# Patient Record
Sex: MALE | Race: WHITE | NOT HISPANIC OR LATINO | Employment: OTHER | URBAN - METROPOLITAN AREA
[De-identification: names, ages, dates, MRNs, and addresses within clinical notes are randomized per-mention and may not be internally consistent; named-entity substitution may affect disease eponyms.]

---

## 2017-11-08 ENCOUNTER — ESTABLISHED COMPREHENSIVE EXAM (OUTPATIENT)
Dept: URBAN - METROPOLITAN AREA CLINIC 39 | Facility: CLINIC | Age: 82
End: 2017-11-08

## 2017-11-08 DIAGNOSIS — E11.9: ICD-10-CM

## 2017-11-08 DIAGNOSIS — H35.373: ICD-10-CM

## 2017-11-08 DIAGNOSIS — Z96.1: ICD-10-CM

## 2017-11-08 DIAGNOSIS — D31.32: ICD-10-CM

## 2017-11-08 DIAGNOSIS — H04.123: ICD-10-CM

## 2017-11-08 DIAGNOSIS — H43.813: ICD-10-CM

## 2017-11-08 PROCEDURE — 92015 DETERMINE REFRACTIVE STATE: CPT

## 2017-11-08 PROCEDURE — 92014 COMPRE OPH EXAM EST PT 1/>: CPT

## 2017-11-08 ASSESSMENT — VISUAL ACUITY
OS_SC: 20/25-1
OD_SC: 20/25-1
OS_SC: J1
OD_SC: J2

## 2017-11-08 ASSESSMENT — TONOMETRY
OD_IOP_MMHG: 13
OS_IOP_MMHG: 13

## 2018-11-14 ENCOUNTER — ESTABLISHED COMPREHENSIVE EXAM (OUTPATIENT)
Dept: URBAN - METROPOLITAN AREA CLINIC 39 | Facility: CLINIC | Age: 83
End: 2018-11-14

## 2018-11-14 DIAGNOSIS — H35.373: ICD-10-CM

## 2018-11-14 DIAGNOSIS — H04.123: ICD-10-CM

## 2018-11-14 DIAGNOSIS — H43.813: ICD-10-CM

## 2018-11-14 DIAGNOSIS — E11.9: ICD-10-CM

## 2018-11-14 DIAGNOSIS — Z96.1: ICD-10-CM

## 2018-11-14 DIAGNOSIS — D31.32: ICD-10-CM

## 2018-11-14 PROCEDURE — 92014 COMPRE OPH EXAM EST PT 1/>: CPT

## 2018-11-14 PROCEDURE — 92134 CPTRZ OPH DX IMG PST SGM RTA: CPT

## 2018-11-14 PROCEDURE — 92015 DETERMINE REFRACTIVE STATE: CPT

## 2018-11-14 ASSESSMENT — TONOMETRY
OS_IOP_MMHG: 10
OD_IOP_MMHG: 12

## 2018-11-14 ASSESSMENT — VISUAL ACUITY
OS_SC: J1
OS_SC: 20/40
OU_SC: J1
OD_SC: J3
OD_SC: 20/50
OU_SC: 20/30-1

## 2018-12-05 ENCOUNTER — FOLLOW UP (OUTPATIENT)
Dept: URBAN - METROPOLITAN AREA CLINIC 39 | Facility: CLINIC | Age: 83
End: 2018-12-05

## 2018-12-05 DIAGNOSIS — H52.01: ICD-10-CM

## 2018-12-05 DIAGNOSIS — H52.203: ICD-10-CM

## 2018-12-05 PROCEDURE — 92015GRNC REFRACTION GLASSES RECHECK - NO CHARGE

## 2018-12-05 ASSESSMENT — VISUAL ACUITY
OS_SC: 20/40
OU_SC: 20/25-1
OS_SC: J2
OD_SC: J2

## 2018-12-05 ASSESSMENT — TONOMETRY
OS_IOP_MMHG: 15
OD_IOP_MMHG: 15

## 2019-02-06 ENCOUNTER — REFRACTION ONLY (OUTPATIENT)
Dept: URBAN - METROPOLITAN AREA CLINIC 39 | Facility: CLINIC | Age: 84
End: 2019-02-06

## 2019-02-06 DIAGNOSIS — H52.01: ICD-10-CM

## 2019-02-06 DIAGNOSIS — H52.203: ICD-10-CM

## 2019-02-06 PROCEDURE — 92015GRNC REFRACTION GLASSES RECHECK - NO CHARGE

## 2019-02-06 ASSESSMENT — VISUAL ACUITY
OS_SC: 20/40
OU_SC: 20/30-1
OU_SC: J2
OD_SC: J3
OD_SC: 20/40
OS_SC: J3

## 2019-09-13 NOTE — PROCEDURE NOTE: SURGICAL
<p>Prior to commencing surgery patient identification, surgical procedure, site, and side were confirmed by Dr. Araseli Huffman. Following topical proparacaine anesthesia, the patient was positioned at the YAG laser, a contact lens coupled to the cornea with methylcellulose and an axial posterior capsulotomy performed without complication using 3.1 Mj x 20. Excess methylcellulose was washed from the eye, one drop of Alphagan was instilled and the patient returned to the holding area having tolerated the procedure well and without complication. </p><p>MRN:[id number]</p>

## 2019-12-09 ENCOUNTER — ESTABLISHED COMPREHENSIVE EXAM (OUTPATIENT)
Dept: URBAN - METROPOLITAN AREA CLINIC 40 | Facility: CLINIC | Age: 84
End: 2019-12-09

## 2019-12-09 DIAGNOSIS — H43.813: ICD-10-CM

## 2019-12-09 DIAGNOSIS — H52.01: ICD-10-CM

## 2019-12-09 DIAGNOSIS — E11.9: ICD-10-CM

## 2019-12-09 DIAGNOSIS — H35.373: ICD-10-CM

## 2019-12-09 DIAGNOSIS — D31.32: ICD-10-CM

## 2019-12-09 DIAGNOSIS — H04.123: ICD-10-CM

## 2019-12-09 DIAGNOSIS — H52.203: ICD-10-CM

## 2019-12-09 PROCEDURE — 92015 DETERMINE REFRACTIVE STATE: CPT

## 2019-12-09 PROCEDURE — 92250 FUNDUS PHOTOGRAPHY W/I&R: CPT

## 2019-12-09 PROCEDURE — 92014 COMPRE OPH EXAM EST PT 1/>: CPT

## 2019-12-09 ASSESSMENT — VISUAL ACUITY
OU_SC: J1
OD_SC: 20/40
OU_SC: 20/20-2
OD_SC: J2
OS_SC: 20/30
OS_SC: J1

## 2019-12-09 ASSESSMENT — TONOMETRY
OD_IOP_MMHG: 12
OS_IOP_MMHG: 12

## 2020-12-14 ENCOUNTER — ESTABLISHED COMPREHENSIVE EXAM (OUTPATIENT)
Dept: URBAN - METROPOLITAN AREA CLINIC 40 | Facility: CLINIC | Age: 85
End: 2020-12-14

## 2020-12-14 DIAGNOSIS — E11.9: ICD-10-CM

## 2020-12-14 DIAGNOSIS — D31.32: ICD-10-CM

## 2020-12-14 DIAGNOSIS — H04.123: ICD-10-CM

## 2020-12-14 DIAGNOSIS — H52.203: ICD-10-CM

## 2020-12-14 DIAGNOSIS — H35.373: ICD-10-CM

## 2020-12-14 DIAGNOSIS — H52.01: ICD-10-CM

## 2020-12-14 DIAGNOSIS — H43.813: ICD-10-CM

## 2020-12-14 PROCEDURE — 92014 COMPRE OPH EXAM EST PT 1/>: CPT

## 2020-12-14 PROCEDURE — 92015 DETERMINE REFRACTIVE STATE: CPT

## 2020-12-14 ASSESSMENT — VISUAL ACUITY
OD_SC: J3
OS_SC: J2
OD_SC: 20/50-2
OS_SC: 20/30
OU_SC: 20/25

## 2020-12-14 ASSESSMENT — TONOMETRY
OD_IOP_MMHG: 12
OS_IOP_MMHG: 12

## 2020-12-29 ENCOUNTER — RETINA CONSULT (OUTPATIENT)
Dept: URBAN - METROPOLITAN AREA CLINIC 39 | Facility: CLINIC | Age: 85
End: 2020-12-29

## 2020-12-29 VITALS
SYSTOLIC BLOOD PRESSURE: 146 MMHG | HEART RATE: 62 BPM | RESPIRATION RATE: 18 BRPM | HEIGHT: 60 IN | DIASTOLIC BLOOD PRESSURE: 82 MMHG

## 2020-12-29 DIAGNOSIS — H35.373: ICD-10-CM

## 2020-12-29 DIAGNOSIS — H43.813: ICD-10-CM

## 2020-12-29 DIAGNOSIS — E11.9: ICD-10-CM

## 2020-12-29 DIAGNOSIS — D31.32: ICD-10-CM

## 2020-12-29 PROCEDURE — 92134 CPTRZ OPH DX IMG PST SGM RTA: CPT

## 2020-12-29 PROCEDURE — 92250 FUNDUS PHOTOGRAPHY W/I&R: CPT

## 2020-12-29 PROCEDURE — 92202 OPSCPY EXTND ON/MAC DRAW: CPT

## 2020-12-29 PROCEDURE — 92014 COMPRE OPH EXAM EST PT 1/>: CPT

## 2020-12-29 ASSESSMENT — TONOMETRY
OD_IOP_MMHG: 13
OS_IOP_MMHG: 14

## 2020-12-29 ASSESSMENT — VISUAL ACUITY
OS_SC: 20/30
OD_SC: 20/40
OS_SC: J2
OD_SC: J3

## 2021-12-20 ENCOUNTER — COMPREHENSIVE EXAM (OUTPATIENT)
Dept: URBAN - METROPOLITAN AREA CLINIC 40 | Facility: CLINIC | Age: 86
End: 2021-12-20

## 2021-12-20 DIAGNOSIS — E11.9: ICD-10-CM

## 2021-12-20 DIAGNOSIS — H52.01: ICD-10-CM

## 2021-12-20 DIAGNOSIS — D31.32: ICD-10-CM

## 2021-12-20 DIAGNOSIS — H43.813: ICD-10-CM

## 2021-12-20 DIAGNOSIS — H52.223: ICD-10-CM

## 2021-12-20 DIAGNOSIS — H35.373: ICD-10-CM

## 2021-12-20 DIAGNOSIS — H04.123: ICD-10-CM

## 2021-12-20 PROCEDURE — 92014 COMPRE OPH EXAM EST PT 1/>: CPT

## 2021-12-20 PROCEDURE — 92015 DETERMINE REFRACTIVE STATE: CPT

## 2021-12-20 ASSESSMENT — VISUAL ACUITY
OS_SC: 20/30+2
OD_SC: 20/40-1
OU_SC: J1
OU_SC: 20/25-2
OS_SC: J1
OD_SC: J2

## 2021-12-20 ASSESSMENT — TONOMETRY
OD_IOP_MMHG: 14
OS_IOP_MMHG: 14

## 2022-12-19 ENCOUNTER — COMPREHENSIVE EXAM (OUTPATIENT)
Dept: URBAN - METROPOLITAN AREA CLINIC 40 | Facility: CLINIC | Age: 87
End: 2022-12-19

## 2022-12-19 PROCEDURE — 92015 DETERMINE REFRACTIVE STATE: CPT

## 2022-12-19 PROCEDURE — 92014 COMPRE OPH EXAM EST PT 1/>: CPT

## 2022-12-19 ASSESSMENT — VISUAL ACUITY
OD_SC: 20/40-1
OS_SC: J3
OS_SC: 20/30-1
OD_SC: J3

## 2022-12-19 ASSESSMENT — TONOMETRY
OS_IOP_MMHG: 13
OD_IOP_MMHG: 14

## 2023-12-18 ENCOUNTER — COMPREHENSIVE EXAM (OUTPATIENT)
Dept: URBAN - METROPOLITAN AREA CLINIC 40 | Facility: CLINIC | Age: 88
End: 2023-12-18

## 2023-12-18 DIAGNOSIS — H04.123: ICD-10-CM

## 2023-12-18 DIAGNOSIS — H35.373: ICD-10-CM

## 2023-12-18 DIAGNOSIS — H52.01: ICD-10-CM

## 2023-12-18 DIAGNOSIS — E11.9: ICD-10-CM

## 2023-12-18 DIAGNOSIS — H52.223: ICD-10-CM

## 2023-12-18 DIAGNOSIS — H02.135: ICD-10-CM

## 2023-12-18 DIAGNOSIS — D31.32: ICD-10-CM

## 2023-12-18 DIAGNOSIS — H43.813: ICD-10-CM

## 2023-12-18 PROCEDURE — 92015 DETERMINE REFRACTIVE STATE: CPT

## 2023-12-18 PROCEDURE — 92014 COMPRE OPH EXAM EST PT 1/>: CPT

## 2023-12-18 ASSESSMENT — VISUAL ACUITY
OD_SC: J3
OD_SC: 20/30-1
OS_SC: 20/25-2
OS_SC: J3

## 2023-12-18 ASSESSMENT — TONOMETRY
OS_IOP_MMHG: 11
OD_IOP_MMHG: 12

## 2025-04-14 ENCOUNTER — COMPREHENSIVE EXAM (OUTPATIENT)
Age: 89
End: 2025-04-14

## 2025-04-14 DIAGNOSIS — H02.135: ICD-10-CM

## 2025-04-14 DIAGNOSIS — H04.123: ICD-10-CM

## 2025-04-14 DIAGNOSIS — H43.813: ICD-10-CM

## 2025-04-14 DIAGNOSIS — H35.373: ICD-10-CM

## 2025-04-14 DIAGNOSIS — E11.9: ICD-10-CM

## 2025-04-14 DIAGNOSIS — D31.32: ICD-10-CM

## 2025-04-14 PROCEDURE — 92015 DETERMINE REFRACTIVE STATE: CPT

## 2025-04-14 PROCEDURE — 92250 FUNDUS PHOTOGRAPHY W/I&R: CPT | Mod: 25

## 2025-04-14 PROCEDURE — 92014 COMPRE OPH EXAM EST PT 1/>: CPT
